# Patient Record
Sex: FEMALE | Race: WHITE | Employment: FULL TIME | URBAN - METROPOLITAN AREA
[De-identification: names, ages, dates, MRNs, and addresses within clinical notes are randomized per-mention and may not be internally consistent; named-entity substitution may affect disease eponyms.]

---
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
## 2022-03-19 PROBLEM — L05.01 PILONIDAL CYST WITH ABSCESS: Status: ACTIVE | Noted: 2019-01-22

## 2023-04-05 ENCOUNTER — OFFICE VISIT (OUTPATIENT)
Dept: FAMILY MEDICINE CLINIC | Facility: CLINIC | Age: 23
End: 2023-04-05
Payer: COMMERCIAL

## 2023-04-05 VITALS
BODY MASS INDEX: 37.03 KG/M2 | WEIGHT: 209 LBS | SYSTOLIC BLOOD PRESSURE: 110 MMHG | HEART RATE: 84 BPM | DIASTOLIC BLOOD PRESSURE: 80 MMHG | TEMPERATURE: 97 F | OXYGEN SATURATION: 97 % | HEIGHT: 63 IN

## 2023-04-05 DIAGNOSIS — J06.9 ACUTE UPPER RESPIRATORY INFECTION, UNSPECIFIED: Primary | ICD-10-CM

## 2023-04-05 DIAGNOSIS — Z32.02 PREGNANCY EXAMINATION OR TEST, NEGATIVE RESULT: ICD-10-CM

## 2023-04-05 LAB
HCG, PREGNANCY, URINE, POC: NEGATIVE
VALID INTERNAL CONTROL, POC: YES

## 2023-04-05 PROCEDURE — 99213 OFFICE O/P EST LOW 20 MIN: CPT | Performed by: NURSE PRACTITIONER

## 2023-04-05 PROCEDURE — 81025 URINE PREGNANCY TEST: CPT | Performed by: NURSE PRACTITIONER

## 2023-04-05 RX ORDER — METHYLPREDNISOLONE 4 MG/1
TABLET ORAL
Qty: 1 KIT | Refills: 0 | Status: SHIPPED | OUTPATIENT
Start: 2023-04-05 | End: 2023-04-11

## 2023-04-05 RX ORDER — AZITHROMYCIN 250 MG/1
250 TABLET, FILM COATED ORAL SEE ADMIN INSTRUCTIONS
Qty: 6 TABLET | Refills: 0 | Status: SHIPPED | OUTPATIENT
Start: 2023-04-05 | End: 2023-04-10

## 2023-04-05 SDOH — ECONOMIC STABILITY: FOOD INSECURITY: WITHIN THE PAST 12 MONTHS, YOU WORRIED THAT YOUR FOOD WOULD RUN OUT BEFORE YOU GOT MONEY TO BUY MORE.: NEVER TRUE

## 2023-04-05 SDOH — ECONOMIC STABILITY: INCOME INSECURITY: HOW HARD IS IT FOR YOU TO PAY FOR THE VERY BASICS LIKE FOOD, HOUSING, MEDICAL CARE, AND HEATING?: NOT HARD AT ALL

## 2023-04-05 SDOH — ECONOMIC STABILITY: FOOD INSECURITY: WITHIN THE PAST 12 MONTHS, THE FOOD YOU BOUGHT JUST DIDN'T LAST AND YOU DIDN'T HAVE MONEY TO GET MORE.: NEVER TRUE

## 2023-04-05 SDOH — ECONOMIC STABILITY: HOUSING INSECURITY
IN THE LAST 12 MONTHS, WAS THERE A TIME WHEN YOU DID NOT HAVE A STEADY PLACE TO SLEEP OR SLEPT IN A SHELTER (INCLUDING NOW)?: NO

## 2023-04-05 ASSESSMENT — ENCOUNTER SYMPTOMS
WHEEZING: 1
SINUS PAIN: 0
DIARRHEA: 0
VOMITING: 0
ABDOMINAL PAIN: 0
RHINORRHEA: 1
COUGH: 1
SWOLLEN GLANDS: 0
SORE THROAT: 0
NAUSEA: 0

## 2023-04-05 ASSESSMENT — PATIENT HEALTH QUESTIONNAIRE - PHQ9
1. LITTLE INTEREST OR PLEASURE IN DOING THINGS: 0
SUM OF ALL RESPONSES TO PHQ QUESTIONS 1-9: 0
2. FEELING DOWN, DEPRESSED OR HOPELESS: 0
SUM OF ALL RESPONSES TO PHQ QUESTIONS 1-9: 0
SUM OF ALL RESPONSES TO PHQ9 QUESTIONS 1 & 2: 0

## 2023-04-05 NOTE — PROGRESS NOTES
rales.   Abdominal:      General: There is no distension. Musculoskeletal:         General: No swelling. Skin:     General: Skin is warm and dry. Neurological:      General: No focal deficit present. Mental Status: She is alert and oriented to person, place, and time. ASSESSMENT & PLAN      I have reviewed the patient's past medical history, social history and family history and vitals. We have discussed treatment plan and follow up and given patient instructions. Patient's questions are answered and we will follow up as indicated. Rosana Brittle was seen today for cough and congestion. Diagnoses and all orders for this visit:    Acute upper respiratory infection, unspecified    Pregnancy examination or test, negative result  -     AMB POC URINE PREGNANCY TEST, VISUAL COLOR COMPARISON     -Z-Tony and steroid for an acute URI. Negative pregnancy test in office today. Directions for use and side effects discussed. Pregnancy precautions discussed.   -Other recommended treatments include: OTC fever/pain reducers such as Tylenol or ibuprofen, antihistamines, nasal steroids, over-the-counter cough suppressants, rest, adequate fluid intake. DO NOT take ibuprofen if you are taking a steroid. Please let office know if symptoms do not improve over the next 1 to 2 weeks or if symptoms worsen- will proceed with chest xray. Return for needs yearly PE, Mulugeta Lozano PA-C.          LA Jaramillo - SHE Billing Type: Third-Party Bill

## (undated) DEVICE — DRAPE TWL SURG 16X26IN BLU ORB04] ALLCARE INC]

## (undated) DEVICE — TRAY PREP DRY W/ PREM GLV 2 APPL 6 SPNG 2 UNDPD 1 OVERWRAP

## (undated) DEVICE — AMD ANTIMICROBIAL GAUZE SPONGES,12 PLY USP TYPE VII, 0.2% POLYHEXAMETHYLENE BIGUANIDE HCI (PHMB): Brand: CURITY

## (undated) DEVICE — NEEDLE HYPO 18GA L1.5IN PNK POLYPR HUB S STL THN WALL FILL

## (undated) DEVICE — SUT ETHLN 3-0 18IN PS2 BLK --

## (undated) DEVICE — SURGICAL PROCEDURE PACK BASIC ST FRANCIS

## (undated) DEVICE — PAD,ABDOMINAL,5"X9",ST,LF,25/BX: Brand: MEDLINE INDUSTRIES, INC.

## (undated) DEVICE — SOLUTION IV 1000ML 0.9% SOD CHL

## (undated) DEVICE — SHEET, T, LAPAROTOMY, STERILE: Brand: MEDLINE

## (undated) DEVICE — SUTURE VCRL SZ 2-0 L27IN ABSRB UD L26MM SH 1/2 CIR J417H

## (undated) DEVICE — GARMENT,MEDLINE,DVT,INT,CALF,MED, GEN2: Brand: MEDLINE

## (undated) DEVICE — DRAIN SURG PENROSE 0.25X12 IN CLOSED WND DRAINAGE PREM SIL

## (undated) DEVICE — SPONGE GZ W4XL4IN COT 12 PLY TYP VII WVN C FLD DSGN

## (undated) DEVICE — BUTTON SWITCH PENCIL BLADE ELECTRODE, HOLSTER: Brand: EDGE

## (undated) DEVICE — TUBING, SUCTION, 1/4" X 10', STRAIGHT: Brand: MEDLINE

## (undated) DEVICE — REM POLYHESIVE ADULT PATIENT RETURN ELECTRODE: Brand: VALLEYLAB